# Patient Record
Sex: FEMALE | Race: WHITE | NOT HISPANIC OR LATINO | URBAN - METROPOLITAN AREA
[De-identification: names, ages, dates, MRNs, and addresses within clinical notes are randomized per-mention and may not be internally consistent; named-entity substitution may affect disease eponyms.]

---

## 2018-04-19 ENCOUNTER — OFFICE (OUTPATIENT)
Dept: URBAN - METROPOLITAN AREA CLINIC 78 | Facility: CLINIC | Age: 28
End: 2018-04-19

## 2018-04-19 VITALS
HEART RATE: 119 BPM | HEIGHT: 64 IN | WEIGHT: 208 LBS | DIASTOLIC BLOOD PRESSURE: 109 MMHG | TEMPERATURE: 98.5 F | SYSTOLIC BLOOD PRESSURE: 147 MMHG

## 2018-04-19 DIAGNOSIS — R74.8 ABNORMAL LEVELS OF OTHER SERUM ENZYMES: ICD-10-CM

## 2018-04-19 DIAGNOSIS — K76.0 FATTY (CHANGE OF) LIVER, NOT ELSEWHERE CLASSIFIED: ICD-10-CM

## 2018-04-19 DIAGNOSIS — E28.2 POLYCYSTIC OVARIAN SYNDROME: ICD-10-CM

## 2018-04-19 PROCEDURE — 99244 OFF/OP CNSLTJ NEW/EST MOD 40: CPT

## 2018-04-19 NOTE — SERVICEHPINOTES
LESIA RENDON   is a   27   year old    female who is being seen in consultation at the request of   ARCADIO HUGHES   for evaluation of her liver. She has h/o fatty liver disease noted on U/S in 2011. Was seen by us at that time but never followed up for additional evaluation of elevated liver enzymes. She has h/o obesity, PCOS, prediabetes, thyroid disease, and since last summer has had tachycardia. Diagnosed with "inappropriate sinus tachycardia" and taking meds to help with that. She was given ivabradine but ended up vomiting with that med and also couldn't tolerate higher dose diltiazem. Was suggested that it could be due to her liver disease which prompted her visit today. She has been following a ketogenic diet since December with result of substantial weight loss (>30 pounds) with plan of continued weight loss. She denies any GI complaints.

## 2018-04-19 NOTE — INTERFACERESULTNOTES
celiac panel neg, Hgb 13.8, MCV 83, plt 273, AST/ALT 20/42, , , HDL 43, , iron sat 20, INR 0.9, HgbA1C 5.1, TSH 0.583, fT4 1.76, ASMA neg, AMA neg, Vit D 21, Hep C ab neg, LKMab neg, cerulo 49, A1AT wnl, insulin 11.1, glucose 92, HBsAg neg, FT3 2.8, AMANDA neg

## 2018-04-20 LAB
ACTIN (SMOOTH MUSCLE) ANTIBODY: 6 UNITS (ref 0–19)
ALPHA-1-ANTITRYPSIN, SERUM: 159 MG/DL (ref 90–200)
AMBIG ABBREV CMP14 DEFAULT: (no result)
AMBIG ABBREV LP DEFAULT: (no result)
ANTINUCLEAR ANTIBODIES, IFA: NEGATIVE
CBC WITH DIFFERENTIAL/PLATELET: BASO (ABSOLUTE): 0 X10E3/UL (ref 0–0.2)
CBC WITH DIFFERENTIAL/PLATELET: BASOS: 0 %
CBC WITH DIFFERENTIAL/PLATELET: EOS (ABSOLUTE): 0.1 X10E3/UL (ref 0–0.4)
CBC WITH DIFFERENTIAL/PLATELET: EOS: 2 %
CBC WITH DIFFERENTIAL/PLATELET: HEMATOCRIT: 40.8 % (ref 34–46.6)
CBC WITH DIFFERENTIAL/PLATELET: HEMATOLOGY COMMENTS: (no result)
CBC WITH DIFFERENTIAL/PLATELET: HEMOGLOBIN: 13.8 G/DL (ref 11.1–15.9)
CBC WITH DIFFERENTIAL/PLATELET: IMMATURE CELLS: (no result)
CBC WITH DIFFERENTIAL/PLATELET: IMMATURE GRANS (ABS): 0 X10E3/UL (ref 0–0.1)
CBC WITH DIFFERENTIAL/PLATELET: IMMATURE GRANULOCYTES: 0 %
CBC WITH DIFFERENTIAL/PLATELET: LYMPHS (ABSOLUTE): 2.1 X10E3/UL (ref 0.7–3.1)
CBC WITH DIFFERENTIAL/PLATELET: LYMPHS: 34 %
CBC WITH DIFFERENTIAL/PLATELET: MCH: 28.1 PG (ref 26.6–33)
CBC WITH DIFFERENTIAL/PLATELET: MCHC: 33.8 G/DL (ref 31.5–35.7)
CBC WITH DIFFERENTIAL/PLATELET: MCV: 83 FL (ref 79–97)
CBC WITH DIFFERENTIAL/PLATELET: MONOCYTES(ABSOLUTE): 0.4 X10E3/UL (ref 0.1–0.9)
CBC WITH DIFFERENTIAL/PLATELET: MONOCYTES: 7 %
CBC WITH DIFFERENTIAL/PLATELET: NEUTROPHILS (ABSOLUTE): 3.5 X10E3/UL (ref 1.4–7)
CBC WITH DIFFERENTIAL/PLATELET: NEUTROPHILS: 57 %
CBC WITH DIFFERENTIAL/PLATELET: NRBC: (no result)
CBC WITH DIFFERENTIAL/PLATELET: PLATELETS: 273 X10E3/UL (ref 150–379)
CBC WITH DIFFERENTIAL/PLATELET: RBC: 4.91 X10E6/UL (ref 3.77–5.28)
CBC WITH DIFFERENTIAL/PLATELET: RDW: 12.9 % (ref 12.3–15.4)
CBC WITH DIFFERENTIAL/PLATELET: WBC: 6.1 X10E3/UL (ref 3.4–10.8)
CELIAC DISEASE COMPREHENSIVE: DEAMIDATED GLIADIN ABS, IGA: 2 UNITS (ref 0–19)
CELIAC DISEASE COMPREHENSIVE: DEAMIDATED GLIADIN ABS, IGG: 1 UNITS (ref 0–19)
CELIAC DISEASE COMPREHENSIVE: ENDOMYSIAL ANTIBODY IGA: NEGATIVE
CELIAC DISEASE COMPREHENSIVE: IMMUNOGLOBULIN A, QN, SERUM: 130 MG/DL (ref 87–352)
CELIAC DISEASE COMPREHENSIVE: T-TRANSGLUTAMINASE (TTG) IGA: <2 U/ML
CELIAC DISEASE COMPREHENSIVE: T-TRANSGLUTAMINASE (TTG) IGG: <2 U/ML
CERULOPLASMIN: 49.5 MG/DL — HIGH (ref 19–39)
COMP. METABOLIC PANEL (14): A/G RATIO: 1.5 (ref 1.2–2.2)
COMP. METABOLIC PANEL (14): ALBUMIN: 4.7 G/DL (ref 3.5–5.5)
COMP. METABOLIC PANEL (14): ALKALINE PHOSPHATASE: 58 IU/L (ref 39–117)
COMP. METABOLIC PANEL (14): ALT (SGPT): 42 IU/L — HIGH (ref 0–32)
COMP. METABOLIC PANEL (14): AST (SGOT): 20 IU/L (ref 0–40)
COMP. METABOLIC PANEL (14): BILIRUBIN, TOTAL: 0.4 MG/DL (ref 0–1.2)
COMP. METABOLIC PANEL (14): BUN/CREATININE RATIO: 21 (ref 9–23)
COMP. METABOLIC PANEL (14): BUN: 16 MG/DL (ref 6–20)
COMP. METABOLIC PANEL (14): CALCIUM: 10 MG/DL (ref 8.7–10.2)
COMP. METABOLIC PANEL (14): CARBON DIOXIDE, TOTAL: 21 MMOL/L (ref 18–29)
COMP. METABOLIC PANEL (14): CHLORIDE: 99 MMOL/L (ref 96–106)
COMP. METABOLIC PANEL (14): CREATININE: 0.78 MG/DL (ref 0.57–1)
COMP. METABOLIC PANEL (14): EGFR IF AFRICN AM: 121 ML/MIN/1.73 (ref 59–?)
COMP. METABOLIC PANEL (14): EGFR IF NONAFRICN AM: 105 ML/MIN/1.73 (ref 59–?)
COMP. METABOLIC PANEL (14): GLOBULIN, TOTAL: 3.1 G/DL (ref 1.5–4.5)
COMP. METABOLIC PANEL (14): GLUCOSE: 92 MG/DL (ref 65–99)
COMP. METABOLIC PANEL (14): POTASSIUM: 4 MMOL/L (ref 3.5–5.2)
COMP. METABOLIC PANEL (14): PROTEIN, TOTAL: 7.8 G/DL (ref 6–8.5)
COMP. METABOLIC PANEL (14): SODIUM: 139 MMOL/L (ref 134–144)
HBSAG SCREEN: NEGATIVE
HCV ANTIBODY: HEP C VIRUS AB: <0.1 S/CO RATIO
HEMOGLOBIN A1C: 5.1 % (ref 4.8–5.6)
INSULIN: 11.1 UIU/ML (ref 2.6–24.9)
IRON AND TIBC: IRON BIND.CAP.(TIBC): 401 UG/DL (ref 250–450)
IRON AND TIBC: IRON SATURATION: 20 % (ref 15–55)
IRON AND TIBC: IRON: 79 UG/DL (ref 27–159)
IRON AND TIBC: UIBC: 322 UG/DL (ref 131–425)
LIPID PANEL: CHOLESTEROL, TOTAL: 260 MG/DL — HIGH (ref 100–199)
LIPID PANEL: COMMENT: (no result)
LIPID PANEL: HDL CHOLESTEROL: 43 MG/DL (ref 39–?)
LIPID PANEL: LDL CHOLESTEROL CALC: 170 MG/DL — HIGH (ref 0–99)
LIPID PANEL: TRIGLYCERIDES: 237 MG/DL — HIGH (ref 0–149)
LIPID PANEL: VLDL CHOLESTEROL CAL: 47 MG/DL — HIGH (ref 5–40)
LIVER-KIDNEY MICROSOMAL AB: 2.3 UNITS (ref 0–20)
MITOCHONDRIAL (M2) ANTIBODY: 4.2 UNITS (ref 0–20)
PROTHROMBIN TIME (PT): INR: 0.9 (ref 0.8–1.2)
PROTHROMBIN TIME (PT): PROTHROMBIN TIME: 10 SEC (ref 9.1–12)
THYROXINE (T4) FREE, DIRECT, S: T4,FREE(DIRECT): 1.76 NG/DL (ref 0.82–1.77)
TRIIODOTHYRONINE,FREE,SERUM: 2.8 PG/ML (ref 2–4.4)
TSH: 0.58 UIU/ML (ref 0.45–4.5)
VITAMIN D, 25-HYDROXY: 21.4 NG/ML — LOW (ref 30–100)

## 2018-06-06 ENCOUNTER — OFFICE (OUTPATIENT)
Dept: URBAN - METROPOLITAN AREA CLINIC 78 | Facility: CLINIC | Age: 28
End: 2018-06-06

## 2018-06-06 VITALS
SYSTOLIC BLOOD PRESSURE: 127 MMHG | HEIGHT: 64 IN | HEART RATE: 98 BPM | TEMPERATURE: 98.2 F | WEIGHT: 205 LBS | DIASTOLIC BLOOD PRESSURE: 96 MMHG

## 2018-06-06 DIAGNOSIS — E28.2 POLYCYSTIC OVARIAN SYNDROME: ICD-10-CM

## 2018-06-06 DIAGNOSIS — R74.8 ABNORMAL LEVELS OF OTHER SERUM ENZYMES: ICD-10-CM

## 2018-06-06 DIAGNOSIS — K76.0 FATTY (CHANGE OF) LIVER, NOT ELSEWHERE CLASSIFIED: ICD-10-CM

## 2018-06-06 PROCEDURE — 99213 OFFICE O/P EST LOW 20 MIN: CPT

## 2018-06-06 NOTE — SERVICEHPINOTES
26 yo female presents for f/u elevated liver enzymes. She has h/o obesity, PCOS, prediabetes, thyroid disease, fatty liver (seen on U/S 2011) and since last summer has had tachycardia. Diagnosed with "inappropriate sinus tachycardia" and taking meds to help with that.  Her recent lab w/u was c/w fatty liver disease/metabolic syndrome and a f/u Fibroscan was done which was negative for fibrosis but showed moderate-severe fatty liver. She has a picture of her results I reviewed on her phone as we haven't received them yet.From December through April, patient followed a ketogenic diet with result of losing about 30 pounds. She felt that she plateaued so currently she is following low carb but not ketogenic. She denies any GI complaints. 4/20/18 celiac panel neg, Hgb 13.8, MCV 83, plt 273, AST/ALT 20/42, , , HDL 43, , iron sat 20, INR 0.9, HgbA1C 5.1, TSH 0.583, fT4 1.76, ASMA neg, AMA neg, Vit D 21, Hep C ab neg, LKMab neg, cerulo 49, A1AT wnl, insulin 11.1, glucose 92, HBsAg neg, FT3 2.8, AMANDA neg

## 2020-06-18 ENCOUNTER — OFFICE (OUTPATIENT)
Dept: URBAN - METROPOLITAN AREA CLINIC 79 | Facility: CLINIC | Age: 30
End: 2020-06-18

## 2020-06-18 PROCEDURE — 00011: CPT | Performed by: INTERNAL MEDICINE
